# Patient Record
Sex: FEMALE
[De-identification: names, ages, dates, MRNs, and addresses within clinical notes are randomized per-mention and may not be internally consistent; named-entity substitution may affect disease eponyms.]

---

## 2018-02-28 PROBLEM — Z00.00 ENCOUNTER FOR PREVENTIVE HEALTH EXAMINATION: Status: ACTIVE | Noted: 2018-02-28

## 2018-03-01 ENCOUNTER — APPOINTMENT (OUTPATIENT)
Dept: PEDIATRIC ALLERGY IMMUNOLOGY | Facility: CLINIC | Age: 33
End: 2018-03-01

## 2018-07-06 ENCOUNTER — RESULT REVIEW (OUTPATIENT)
Age: 33
End: 2018-07-06

## 2024-02-26 ENCOUNTER — APPOINTMENT (OUTPATIENT)
Dept: ORTHOPEDIC SURGERY | Facility: CLINIC | Age: 39
End: 2024-02-26
Payer: COMMERCIAL

## 2024-02-26 VITALS — RESPIRATION RATE: 16 BRPM | BODY MASS INDEX: 21.35 KG/M2 | HEIGHT: 67 IN | WEIGHT: 136 LBS

## 2024-02-26 DIAGNOSIS — Z78.9 OTHER SPECIFIED HEALTH STATUS: ICD-10-CM

## 2024-02-26 DIAGNOSIS — G25.89 OTHER SPECIFIED EXTRAPYRAMIDAL AND MOVEMENT DISORDERS: ICD-10-CM

## 2024-02-26 DIAGNOSIS — M24.212 DISORDER OF LIGAMENT, LEFT SHOULDER: ICD-10-CM

## 2024-02-26 PROCEDURE — 72050 X-RAY EXAM NECK SPINE 4/5VWS: CPT

## 2024-02-26 PROCEDURE — 99203 OFFICE O/P NEW LOW 30 MIN: CPT | Mod: 25

## 2024-02-26 PROCEDURE — 73030 X-RAY EXAM OF SHOULDER: CPT | Mod: LT

## 2024-02-26 RX ORDER — LEVOTHYROXINE SODIUM 0.07 MG/1
75 TABLET ORAL
Qty: 30 | Refills: 0 | Status: ACTIVE | COMMUNITY
Start: 2024-01-26

## 2024-02-26 RX ORDER — NITROFURANTOIN (MONOHYDRATE/MACROCRYSTALS) 25; 75 MG/1; MG/1
100 CAPSULE ORAL
Qty: 10 | Refills: 0 | Status: ACTIVE | COMMUNITY
Start: 2023-12-27

## 2024-02-26 RX ORDER — METFORMIN HYDROCHLORIDE 625 MG/1
TABLET ORAL
Refills: 0 | Status: ACTIVE | COMMUNITY

## 2024-02-26 RX ORDER — RIFAXIMIN 550 MG/1
TABLET ORAL
Refills: 0 | Status: ACTIVE | COMMUNITY

## 2024-02-26 RX ORDER — SPIRONOLACTONE 50 MG/1
50 TABLET ORAL
Qty: 30 | Refills: 0 | Status: ACTIVE | COMMUNITY
Start: 2024-01-26

## 2024-04-22 ENCOUNTER — APPOINTMENT (OUTPATIENT)
Dept: ORTHOPEDIC SURGERY | Facility: CLINIC | Age: 39
End: 2024-04-22

## 2024-09-11 ENCOUNTER — APPOINTMENT (OUTPATIENT)
Dept: ORTHOPEDIC SURGERY | Facility: CLINIC | Age: 39
End: 2024-09-11

## 2024-09-30 ENCOUNTER — APPOINTMENT (OUTPATIENT)
Dept: ORTHOPEDIC SURGERY | Facility: CLINIC | Age: 39
End: 2024-09-30
Payer: COMMERCIAL

## 2024-09-30 VITALS — WEIGHT: 136 LBS | HEIGHT: 67 IN | RESPIRATION RATE: 18 BRPM | BODY MASS INDEX: 21.35 KG/M2

## 2024-09-30 PROCEDURE — 99213 OFFICE O/P EST LOW 20 MIN: CPT

## 2024-10-02 ENCOUNTER — APPOINTMENT (OUTPATIENT)
Dept: PHYSICAL MEDICINE AND REHAB | Facility: CLINIC | Age: 39
End: 2024-10-02
Payer: COMMERCIAL

## 2024-10-02 VITALS
BODY MASS INDEX: 19.62 KG/M2 | SYSTOLIC BLOOD PRESSURE: 102 MMHG | DIASTOLIC BLOOD PRESSURE: 69 MMHG | HEIGHT: 67 IN | HEART RATE: 60 BPM | OXYGEN SATURATION: 100 % | WEIGHT: 125 LBS

## 2024-10-02 DIAGNOSIS — M79.18 MYALGIA, OTHER SITE: ICD-10-CM

## 2024-10-02 DIAGNOSIS — G25.89 OTHER SPECIFIED EXTRAPYRAMIDAL AND MOVEMENT DISORDERS: ICD-10-CM

## 2024-10-02 DIAGNOSIS — Z86.39 PERSONAL HISTORY OF OTHER ENDOCRINE, NUTRITIONAL AND METABOLIC DISEASE: ICD-10-CM

## 2024-10-02 DIAGNOSIS — M24.212 DISORDER OF LIGAMENT, LEFT SHOULDER: ICD-10-CM

## 2024-10-02 PROCEDURE — 99204 OFFICE O/P NEW MOD 45 MIN: CPT

## 2024-10-02 RX ORDER — METHOCARBAMOL 750 MG/1
750 TABLET, FILM COATED ORAL EVERY 6 HOURS
Qty: 120 | Refills: 2 | Status: ACTIVE | COMMUNITY
Start: 2024-10-02 | End: 1900-01-01

## 2024-10-08 NOTE — ASSESSMENT
[FreeTextEntry1] : Patient presenting today for evaluation of left cervical pain.  Most likely myofascial syndrome due to compensation in the setting of ligamentous laxity of the left shoulder.  Discussed treatment options including trigger point medications Botox.  Discussed that Botox may help the myofascial syndrome however would likely exacerbate the underlying mechanism pathology i.e. further destabilization of an already lax shoulder.  Explained that the mainstay of care for her would be shoulder girdle stability physical therapy and maintenance.  Plan as follows: - Methocarbamol 750 every 6. - Naproxen as needed - Follow-up for trigger point injections  Tanner Godinez DO, FAAPMR Attending Physician, Interventional Pain Medicine  Department of Physical Medicine and Rehabilitation [facility] | [place] [address] [address] [place] [address] [address] [place] [address] Email: [email or web address]  I have spent greater than 45 minutes preparing to see the patient, collecting relevant history, performing a thorough history and physical examination, counseling the patient regarding my findings ordering the appropriate therapies and tests, communicating with other relevant healthcare professionals, documenting my encounter and coordinating care.

## 2024-10-08 NOTE — HISTORY OF PRESENT ILLNESS
[Other: ___] : [unfilled] [6] : a current pain level of 6/10 [2] : a minimum pain level of 2/10 [Sharp] : sharp [Aching] : aching [Bilateral] : bilateral [Shoulder] : shoulder [Stable] : are stable [PT] : PT [FreeTextEntry1] : Referring Physician: Dr. John Benavides  10/02/2024 Ms. ENRIQUE VILLALTA is a very pleasant 38 -year female who comes in for evaluation of Bilateral Shoulder Pain that has been ongoing for over 1 year without any specific injury or inciting event. Patient has tried Physical Therapy which did help relief temporarily. The pain is located primarily Bilateral Shoulder Pain > on the Right shoulder radiating to traps, shoulder blades and the cervical area intermittent and described as tight, stiff, sharp, deep. The pain is rated as 6/10 and ranges from 2-9/10. The patient's symptoms are aggravated by sleeping, movement of the neck and alleviated by massage, heat, stretching. 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 The patient has no other complaints at this time.    [Did the prior interventions help?] : The intervention(s) did not help [de-identified] : tight, stiff, deep [FreeTextEntry2] : traps, shoulder blades [FreeTextEntry3] : sleeping, movement of the neck

## 2024-10-08 NOTE — PHYSICAL EXAM
[FreeTextEntry1] :  Gen: A+O x 3 in NAD Psych: Normal mood and affect. Responds appropriately to commands  Eyes: Anicteric. No discharge. EOMI. Resp: Breathing unlabored  CV: Well Perfused Ext: No c/c/e  Skin: No lesions noted    Gait: Non antalgic, normal reciprocating heel to toe, able to stand on toes and heels.  Tandem gait intact  Negative romberg    Stance: No Trendelenburg sign present with single leg stance     Neuro:   Tone: Normal. No clonus.  Sensation: Grossly intact to light touch and pinprick bilateral lower limbs. Proprioception: Intact at big toes bilaterally.  Reflexes: 2+  symmetric knee jerk, medial hamstring, ankle jerk BR and triceps. Plantars downgoing bilaterally.    MMT:  5/5 in b/l lower extremities  and upper extremities     Spine:   Inspection:  No visual or normalities normal lordosis is maintained  Palpation:  no tenderness to palpation along the cervical paraspinal muscular trigger trapezius area skin lesions or occipital area   Cervical  ROM: Flexion, extension, side-bending, rotation, limited in most planes  pain with lateral flexion  pain with oblique extension  pain with lateral rotation       Special Tests:   - Spurling's negative bilaterally  -Axial loading of the cervical spine is negative bilaterally  -Benjamín negative bilaly   -Addisons negative bilaterally   -Normal dynamic and static balance   -Hoffmans negative bilaterally   -Hypermobility of left shoulder

## 2024-10-08 NOTE — ASSESSMENT
[FreeTextEntry1] : Patient presenting today for evaluation of left cervical pain.  Most likely myofascial syndrome due to compensation in the setting of ligamentous laxity of the left shoulder.  Discussed treatment options including trigger point medications Botox.  Discussed that Botox may help the myofascial syndrome however would likely exacerbate the underlying mechanism pathology i.e. further destabilization of an already lax shoulder.  Explained that the mainstay of care for her would be shoulder girdle stability physical therapy and maintenance.  Plan as follows: - Methocarbamol 750 every 6. - Naproxen as needed - Follow-up for trigger point injections  Tanner Godinez DO, FAAPMR Attending Physician, Interventional Pain Medicine  Department of Physical Medicine and Rehabilitation [facility] | [place] [address] [address] [place] [address] [address] [place] [address] Email: [email or web address]  I have spent greater than 45 minutes preparing to see the patient, collecting relevant history, performing a thorough history and physical examination, counseling the patient regarding my findings ordering the appropriate therapies and tests, communicating with other relevant healthcare professionals, documenting my encounter and coordinating care.

## 2024-10-08 NOTE — HISTORY OF PRESENT ILLNESS
[Other: ___] : [unfilled] [6] : a current pain level of 6/10 [2] : a minimum pain level of 2/10 [Sharp] : sharp [Aching] : aching [Bilateral] : bilateral [Shoulder] : shoulder [Stable] : are stable [PT] : PT [FreeTextEntry1] : Referring Physician: Dr. John Benavides  10/02/2024 Ms. ENRIQUE VILLALTA is a very pleasant 38 -year female who comes in for evaluation of Bilateral Shoulder Pain that has been ongoing for over 1 year without any specific injury or inciting event. Patient has tried Physical Therapy which did help relief temporarily. The pain is located primarily Bilateral Shoulder Pain > on the Right shoulder radiating to traps, shoulder blades and the cervical area intermittent and described as tight, stiff, sharp, deep. The pain is rated as 6/10 and ranges from 2-9/10. The patient's symptoms are aggravated by sleeping, movement of the neck and alleviated by massage, heat, stretching. 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 The patient has no other complaints at this time.    [Did the prior interventions help?] : The intervention(s) did not help [de-identified] : tight, stiff, deep [FreeTextEntry2] : traps, shoulder blades [FreeTextEntry3] : sleeping, movement of the neck

## 2024-10-11 ENCOUNTER — APPOINTMENT (OUTPATIENT)
Dept: PHYSICAL MEDICINE AND REHAB | Facility: CLINIC | Age: 39
End: 2024-10-11
Payer: COMMERCIAL

## 2024-10-11 VITALS
BODY MASS INDEX: 19.62 KG/M2 | HEART RATE: 78 BPM | HEIGHT: 67 IN | OXYGEN SATURATION: 100 % | SYSTOLIC BLOOD PRESSURE: 107 MMHG | WEIGHT: 125 LBS | DIASTOLIC BLOOD PRESSURE: 72 MMHG

## 2024-10-11 PROCEDURE — 20553 NJX 1/MLT TRIGGER POINTS 3/>: CPT

## 2024-10-11 PROCEDURE — 98926 OSTEOPATH MANJ 3-4 REGIONS: CPT

## 2025-01-10 ENCOUNTER — APPOINTMENT (OUTPATIENT)
Dept: PHYSICAL MEDICINE AND REHAB | Facility: CLINIC | Age: 40
End: 2025-01-10